# Patient Record
Sex: MALE | Race: WHITE | NOT HISPANIC OR LATINO
[De-identification: names, ages, dates, MRNs, and addresses within clinical notes are randomized per-mention and may not be internally consistent; named-entity substitution may affect disease eponyms.]

---

## 2017-07-18 ENCOUNTER — APPOINTMENT (OUTPATIENT)
Dept: OPHTHALMOLOGY | Facility: CLINIC | Age: 62
End: 2017-07-18

## 2017-07-18 PROBLEM — Z00.00 ENCOUNTER FOR PREVENTIVE HEALTH EXAMINATION: Status: ACTIVE | Noted: 2017-07-18

## 2021-02-05 ENCOUNTER — APPOINTMENT (OUTPATIENT)
Dept: COLORECTAL SURGERY | Facility: CLINIC | Age: 66
End: 2021-02-05
Payer: MEDICARE

## 2021-02-05 VITALS
TEMPERATURE: 98.4 F | HEIGHT: 74 IN | BODY MASS INDEX: 23.61 KG/M2 | WEIGHT: 184 LBS | HEART RATE: 55 BPM | SYSTOLIC BLOOD PRESSURE: 154 MMHG | DIASTOLIC BLOOD PRESSURE: 89 MMHG

## 2021-02-05 DIAGNOSIS — G47.30 SLEEP APNEA, UNSPECIFIED: ICD-10-CM

## 2021-02-05 DIAGNOSIS — Z80.0 FAMILY HISTORY OF MALIGNANT NEOPLASM OF DIGESTIVE ORGANS: ICD-10-CM

## 2021-02-05 DIAGNOSIS — Z87.820 PERSONAL HISTORY OF TRAUMATIC BRAIN INJURY: ICD-10-CM

## 2021-02-05 DIAGNOSIS — Z63.5 DISRUPTION OF FAMILY BY SEPARATION AND DIVORCE: ICD-10-CM

## 2021-02-05 PROCEDURE — 99072 ADDL SUPL MATRL&STAF TM PHE: CPT

## 2021-02-05 PROCEDURE — 99205 OFFICE O/P NEW HI 60 MIN: CPT

## 2021-02-05 SDOH — SOCIAL STABILITY - SOCIAL INSECURITY: DISRUPTION OF FAMILY BY SEPARATION AND DIVORCE: Z63.5

## 2021-02-05 NOTE — HISTORY OF PRESENT ILLNESS
[FreeTextEntry1] : 66 y/o M presents for evaluation of colon cancer\par \par Colonoscopy completed 1/23/21 with Dr. Boogie\par - 5 mm polyp found in the mid-ascending colon, resected and retrieved\par - 4 mm polyp in the mid-ascending colon, resected and retrieved\par - 2-3 mm polyp in the proximal transverse colon, multiple biopsies taken\par - 2-3 mm polyp in the mid-transverse colon, multiple biopsies taken\par - single 8 mm polyp in the distal transverse colon, resected and retrieved\par - 2 cm mass found in the sigmoid colon at 35 cm from the anus, areas tattooed\par \par Pathology\par A.) mid-ascending colon; TA\par B.) mid-ascending colon; TA\par C.) proximal transverse colon; colonic mucosa showing no significant histopathologic findings \par D.) mid-transverse colon; TA \par E.) distal transverse colon; TA\par F.) sigmoid colon; superficial fragments of adenocarcinoma \par \par Prior colonoscopy completed in  2016. Hemorrhoid and mild diverticulosis noted, prior h/o polyps\par \par CT C/A/P ordered by Dr. Boogie, authorization for A/P obtained but chest denied. Patient states they are working on getting approval and would schedule patient after appointment today \par \par Labs drawn 1/30/21. CEA- 6\par \par Several weeks ago had 2 episodes of BRBPR on the stool. Patient went for evaluation of bleeding with PCP who referred patient to GI for repeat colonoscopy\par Denies abdominal or rectal pain \par C/o intermittent nausea for ~ 4 months without episodes of emesis. Patient states he believes it may be related to anxiety \par Denies change in appetite or weight\par BH: Daily\par Denies constipation, diarrhea or straining \par Reports adequate dietary fiber intake, follows a vegetarian diet. Water intake is adequate \par FMH of colon cancer in mother, diagnosed age 81. Father w/ sarcoma diagnosed age 81

## 2021-02-05 NOTE — ASSESSMENT
[FreeTextEntry1] : I had extensive discussion with the patient (45 minutes) regarding the diagnosis and treatment options. I recommended that he consider proceeding with a robotic sigmoid colectomy.\par \par The associated risks, benefits, alternatives of the procedure have been outlined discussed and reviewed with the patient's family. These risks including but not limited to bleeding, infection, anastomotic leak, need for secondary surgery, need for ileostomy or colostomy creation, change in bowel habits, dvt, PE,, as well as the risk of heart and lung complications infection and death were detailed. The patient understands these risks and consents the planned procedure. Appropriate  literature regarding surgery and post operative treatment/complications and enhanced recovery pathway has been detailed and reviewed. Consent was obtained. All questions were answered.\par

## 2021-02-10 ENCOUNTER — RESULT REVIEW (OUTPATIENT)
Age: 66
End: 2021-02-10

## 2021-02-10 PROCEDURE — 88321 CONSLTJ&REPRT SLD PREP ELSWR: CPT

## 2021-02-11 ENCOUNTER — NON-APPOINTMENT (OUTPATIENT)
Age: 66
End: 2021-02-11

## 2021-02-11 ENCOUNTER — OUTPATIENT (OUTPATIENT)
Dept: OUTPATIENT SERVICES | Facility: HOSPITAL | Age: 66
LOS: 1 days | End: 2021-02-11
Payer: MEDICARE

## 2021-02-11 DIAGNOSIS — C18.9 MALIGNANT NEOPLASM OF COLON, UNSPECIFIED: ICD-10-CM

## 2021-02-11 LAB — SURGICAL PATHOLOGY STUDY: SIGNIFICANT CHANGE UP

## 2021-02-11 PROCEDURE — 88321 CONSLTJ&REPRT SLD PREP ELSWR: CPT

## 2021-02-17 ENCOUNTER — TRANSCRIPTION ENCOUNTER (OUTPATIENT)
Age: 66
End: 2021-02-17

## 2021-02-17 ENCOUNTER — APPOINTMENT (OUTPATIENT)
Dept: HEART AND VASCULAR | Facility: CLINIC | Age: 66
End: 2021-02-17
Payer: MEDICARE

## 2021-02-17 ENCOUNTER — NON-APPOINTMENT (OUTPATIENT)
Age: 66
End: 2021-02-17

## 2021-02-17 VITALS
SYSTOLIC BLOOD PRESSURE: 141 MMHG | DIASTOLIC BLOOD PRESSURE: 69 MMHG | WEIGHT: 180 LBS | TEMPERATURE: 98.2 F | HEART RATE: 68 BPM | RESPIRATION RATE: 18 BRPM | OXYGEN SATURATION: 98 % | BODY MASS INDEX: 23.1 KG/M2 | HEIGHT: 74 IN

## 2021-02-17 DIAGNOSIS — F41.9 ANXIETY DISORDER, UNSPECIFIED: ICD-10-CM

## 2021-02-17 DIAGNOSIS — Z86.59 PERSONAL HISTORY OF OTHER MENTAL AND BEHAVIORAL DISORDERS: ICD-10-CM

## 2021-02-17 PROCEDURE — 99204 OFFICE O/P NEW MOD 45 MIN: CPT

## 2021-02-17 PROCEDURE — 93000 ELECTROCARDIOGRAM COMPLETE: CPT | Mod: NC

## 2021-02-17 PROCEDURE — 99072 ADDL SUPL MATRL&STAF TM PHE: CPT

## 2021-02-17 NOTE — PHYSICAL EXAM
[General Appearance - Well Developed] : well developed [Normal Appearance] : normal appearance [Well Groomed] : well groomed [General Appearance - Well Nourished] : well nourished [No Deformities] : no deformities [General Appearance - In No Acute Distress] : no acute distress [Normal Conjunctiva] : the conjunctiva exhibited no abnormalities [Eyelids - No Xanthelasma] : the eyelids demonstrated no xanthelasmas [Normal Oral Mucosa] : normal oral mucosa [No Oral Pallor] : no oral pallor [No Oral Cyanosis] : no oral cyanosis [Normal Jugular Venous A Waves Present] : normal jugular venous A waves present [Normal Jugular Venous V Waves Present] : normal jugular venous V waves present [No Jugular Venous Faust A Waves] : no jugular venous faust A waves [Respiration, Rhythm And Depth] : normal respiratory rhythm and effort [Auscultation Breath Sounds / Voice Sounds] : lungs were clear to auscultation bilaterally [Exaggerated Use Of Accessory Muscles For Inspiration] : no accessory muscle use [Heart Rate And Rhythm] : heart rate and rhythm were normal [Heart Sounds] : normal S1 and S2 [Murmurs] : no murmurs present [Abdomen Soft] : soft [Abdomen Tenderness] : non-tender [Abdomen Mass (___ Cm)] : no abdominal mass palpated [Abnormal Walk] : normal gait [Gait - Sufficient For Exercise Testing] : the gait was sufficient for exercise testing [Nail Clubbing] : no clubbing of the fingernails [Cyanosis, Localized] : no localized cyanosis [Petechial Hemorrhages (___cm)] : no petechial hemorrhages [Skin Color & Pigmentation] : normal skin color and pigmentation [] : no rash [No Venous Stasis] : no venous stasis [Skin Lesions] : no skin lesions [No Skin Ulcers] : no skin ulcer [No Xanthoma] : no  xanthoma was observed [Oriented To Time, Place, And Person] : oriented to person, place, and time [Affect] : the affect was normal [Mood] : the mood was normal [No Anxiety] : not feeling anxious

## 2021-02-18 ENCOUNTER — APPOINTMENT (OUTPATIENT)
Dept: HEART AND VASCULAR | Facility: CLINIC | Age: 66
End: 2021-02-18

## 2021-02-18 PROBLEM — Z86.59 HISTORY OF DEPRESSION: Status: RESOLVED | Noted: 2021-02-17 | Resolved: 2021-02-18
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
## 2021-02-18 NOTE — DISCUSSION/SUMMARY
[Procedure Intermediate Risk] : the procedure risk is intermediate [Patient Intermediate Risk] : the patient is an intermediate risk [Optimized for Surgery] : the patient is optimized for surgery [FreeTextEntry1] : Pre-op From cardiovascular standpoint, Mr. ZURITA is of acceptable risk for the planned procedure and may move forward without further cardiovascular testing.\par PACs not a concerning finding, given good exercise tolerance. EKG reviewed.

## 2021-02-18 NOTE — REVIEW OF SYSTEMS
[Feeling Fatigued] : feeling fatigued [Shortness Of Breath] : shortness of breath [Nausea] : nausea [Negative] : Endocrine [Dyspnea on exertion] : not dyspnea during exertion [Chest  Pressure] : no chest pressure [Lower Ext Edema] : no extremity edema [Leg Claudication] : no intermittent leg claudication [Palpitations] : no palpitations

## 2021-02-22 ENCOUNTER — NON-APPOINTMENT (OUTPATIENT)
Age: 66
End: 2021-02-22

## 2021-02-22 ENCOUNTER — TRANSCRIPTION ENCOUNTER (OUTPATIENT)
Age: 66
End: 2021-02-22

## 2021-02-22 VITALS
DIASTOLIC BLOOD PRESSURE: 87 MMHG | SYSTOLIC BLOOD PRESSURE: 146 MMHG | HEART RATE: 88 BPM | RESPIRATION RATE: 16 BRPM | HEIGHT: 74 IN | WEIGHT: 182.76 LBS | OXYGEN SATURATION: 100 % | TEMPERATURE: 97 F

## 2021-02-22 LAB — SARS-COV-2 N GENE NPH QL NAA+PROBE: NOT DETECTED

## 2021-02-22 NOTE — PRE-OP CHECKLIST - SELECT TESTS ORDERED
CT thorax, Covid negative 2/20/CBC/CMP/PT/PTT/INR/EKG CT thorax, Covid negative 2/20/CBC/CMP/PT/PTT/INR/Type and Screen/EKG/COVID

## 2021-02-23 ENCOUNTER — RESULT REVIEW (OUTPATIENT)
Age: 66
End: 2021-02-23

## 2021-02-23 ENCOUNTER — INPATIENT (INPATIENT)
Facility: HOSPITAL | Age: 66
LOS: 1 days | Discharge: HOME | DRG: 331 | End: 2021-02-25
Attending: SURGERY | Admitting: SURGERY
Payer: MEDICARE

## 2021-02-23 ENCOUNTER — APPOINTMENT (OUTPATIENT)
Dept: COLORECTAL SURGERY | Facility: HOSPITAL | Age: 66
End: 2021-02-23

## 2021-02-23 DIAGNOSIS — Z41.9 ENCOUNTER FOR PROCEDURE FOR PURPOSES OTHER THAN REMEDYING HEALTH STATE, UNSPECIFIED: Chronic | ICD-10-CM

## 2021-02-23 LAB
ANION GAP SERPL CALC-SCNC: 9 MMOL/L — SIGNIFICANT CHANGE UP (ref 5–17)
BLD GP AB SCN SERPL QL: NEGATIVE — SIGNIFICANT CHANGE UP
BUN SERPL-MCNC: 8 MG/DL — SIGNIFICANT CHANGE UP (ref 7–23)
CALCIUM SERPL-MCNC: 8.8 MG/DL — SIGNIFICANT CHANGE UP (ref 8.4–10.5)
CHLORIDE SERPL-SCNC: 105 MMOL/L — SIGNIFICANT CHANGE UP (ref 96–108)
CO2 SERPL-SCNC: 23 MMOL/L — SIGNIFICANT CHANGE UP (ref 22–31)
CREAT SERPL-MCNC: 1.11 MG/DL — SIGNIFICANT CHANGE UP (ref 0.5–1.3)
GLUCOSE SERPL-MCNC: 157 MG/DL — HIGH (ref 70–99)
HCT VFR BLD CALC: 41.7 % — SIGNIFICANT CHANGE UP (ref 39–50)
HGB BLD-MCNC: 13.7 G/DL — SIGNIFICANT CHANGE UP (ref 13–17)
MAGNESIUM SERPL-MCNC: 1.8 MG/DL — SIGNIFICANT CHANGE UP (ref 1.6–2.6)
MCHC RBC-ENTMCNC: 31.9 PG — SIGNIFICANT CHANGE UP (ref 27–34)
MCHC RBC-ENTMCNC: 32.9 GM/DL — SIGNIFICANT CHANGE UP (ref 32–36)
MCV RBC AUTO: 97.2 FL — SIGNIFICANT CHANGE UP (ref 80–100)
NRBC # BLD: 0 /100 WBCS — SIGNIFICANT CHANGE UP (ref 0–0)
PHOSPHATE SERPL-MCNC: 4 MG/DL — SIGNIFICANT CHANGE UP (ref 2.5–4.5)
PLATELET # BLD AUTO: 152 K/UL — SIGNIFICANT CHANGE UP (ref 150–400)
POTASSIUM SERPL-MCNC: 4.1 MMOL/L — SIGNIFICANT CHANGE UP (ref 3.5–5.3)
POTASSIUM SERPL-SCNC: 4.1 MMOL/L — SIGNIFICANT CHANGE UP (ref 3.5–5.3)
RBC # BLD: 4.29 M/UL — SIGNIFICANT CHANGE UP (ref 4.2–5.8)
RBC # FLD: 12.4 % — SIGNIFICANT CHANGE UP (ref 10.3–14.5)
RH IG SCN BLD-IMP: POSITIVE — SIGNIFICANT CHANGE UP
SODIUM SERPL-SCNC: 137 MMOL/L — SIGNIFICANT CHANGE UP (ref 135–145)
WBC # BLD: 8.18 K/UL — SIGNIFICANT CHANGE UP (ref 3.8–10.5)
WBC # FLD AUTO: 8.18 K/UL — SIGNIFICANT CHANGE UP (ref 3.8–10.5)

## 2021-02-23 PROCEDURE — 45330 DIAGNOSTIC SIGMOIDOSCOPY: CPT | Mod: GC

## 2021-02-23 PROCEDURE — 44213 LAP MOBIL SPLENIC FL ADD-ON: CPT | Mod: GC

## 2021-02-23 PROCEDURE — 99222 1ST HOSP IP/OBS MODERATE 55: CPT

## 2021-02-23 PROCEDURE — 88304 TISSUE EXAM BY PATHOLOGIST: CPT | Mod: 26

## 2021-02-23 PROCEDURE — 88309 TISSUE EXAM BY PATHOLOGIST: CPT | Mod: 26

## 2021-02-23 PROCEDURE — 44207 L COLECTOMY/COLOPROCTOSTOMY: CPT | Mod: GC

## 2021-02-23 RX ORDER — ACETAMINOPHEN 500 MG
1000 TABLET ORAL ONCE
Refills: 0 | Status: COMPLETED | OUTPATIENT
Start: 2021-02-23 | End: 2021-02-23

## 2021-02-23 RX ORDER — SODIUM CHLORIDE 9 MG/ML
1000 INJECTION, SOLUTION INTRAVENOUS
Refills: 0 | Status: DISCONTINUED | OUTPATIENT
Start: 2021-02-23 | End: 2021-02-24

## 2021-02-23 RX ORDER — BUPIVACAINE 13.3 MG/ML
20 INJECTION, SUSPENSION, LIPOSOMAL INFILTRATION ONCE
Refills: 0 | Status: DISCONTINUED | OUTPATIENT
Start: 2021-02-23 | End: 2021-02-23

## 2021-02-23 RX ORDER — KETOROLAC TROMETHAMINE 30 MG/ML
15 SYRINGE (ML) INJECTION EVERY 6 HOURS
Refills: 0 | Status: DISCONTINUED | OUTPATIENT
Start: 2021-02-23 | End: 2021-02-25

## 2021-02-23 RX ORDER — HEPARIN SODIUM 5000 [USP'U]/ML
5000 INJECTION INTRAVENOUS; SUBCUTANEOUS ONCE
Refills: 0 | Status: COMPLETED | OUTPATIENT
Start: 2021-02-23 | End: 2021-02-23

## 2021-02-23 RX ORDER — ONDANSETRON 8 MG/1
4 TABLET, FILM COATED ORAL EVERY 6 HOURS
Refills: 0 | Status: DISCONTINUED | OUTPATIENT
Start: 2021-02-23 | End: 2021-02-25

## 2021-02-23 RX ORDER — HYDROMORPHONE HYDROCHLORIDE 2 MG/ML
0.5 INJECTION INTRAMUSCULAR; INTRAVENOUS; SUBCUTANEOUS EVERY 4 HOURS
Refills: 0 | Status: DISCONTINUED | OUTPATIENT
Start: 2021-02-23 | End: 2021-02-25

## 2021-02-23 RX ORDER — MAGNESIUM SULFATE 500 MG/ML
1 VIAL (ML) INJECTION ONCE
Refills: 0 | Status: COMPLETED | OUTPATIENT
Start: 2021-02-23 | End: 2021-02-23

## 2021-02-23 RX ORDER — ACETAMINOPHEN 500 MG
1000 TABLET ORAL EVERY 6 HOURS
Refills: 0 | Status: DISCONTINUED | OUTPATIENT
Start: 2021-02-23 | End: 2021-02-25

## 2021-02-23 RX ORDER — HEPARIN SODIUM 5000 [USP'U]/ML
5000 INJECTION INTRAVENOUS; SUBCUTANEOUS EVERY 8 HOURS
Refills: 0 | Status: DISCONTINUED | OUTPATIENT
Start: 2021-02-23 | End: 2021-02-25

## 2021-02-23 RX ORDER — GABAPENTIN 400 MG/1
600 CAPSULE ORAL ONCE
Refills: 0 | Status: COMPLETED | OUTPATIENT
Start: 2021-02-23 | End: 2021-02-23

## 2021-02-23 RX ADMIN — Medication 100 GRAM(S): at 20:07

## 2021-02-23 RX ADMIN — Medication 1000 MILLIGRAM(S): at 10:34

## 2021-02-23 RX ADMIN — GABAPENTIN 600 MILLIGRAM(S): 400 CAPSULE ORAL at 10:34

## 2021-02-23 RX ADMIN — HYDROMORPHONE HYDROCHLORIDE 0.5 MILLIGRAM(S): 2 INJECTION INTRAMUSCULAR; INTRAVENOUS; SUBCUTANEOUS at 19:04

## 2021-02-23 RX ADMIN — Medication 1000 MILLIGRAM(S): at 21:44

## 2021-02-23 RX ADMIN — ONDANSETRON 4 MILLIGRAM(S): 8 TABLET, FILM COATED ORAL at 22:12

## 2021-02-23 RX ADMIN — HEPARIN SODIUM 5000 UNIT(S): 5000 INJECTION INTRAVENOUS; SUBCUTANEOUS at 21:44

## 2021-02-23 RX ADMIN — HEPARIN SODIUM 5000 UNIT(S): 5000 INJECTION INTRAVENOUS; SUBCUTANEOUS at 10:34

## 2021-02-23 NOTE — H&P ADULT - NSICDXPASTSURGICALHX_GEN_ALL_CORE_FT
PAST SURGICAL HISTORY:  Surgery, elective b/l inguinal hernia repair    Surgery, elective prostate surgery for enlarged prostate    Surgery, elective b/l cataract extraction    Surgery, elective left big toe surgery
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
## 2021-02-23 NOTE — PROGRESS NOTE ADULT - SUBJECTIVE AND OBJECTIVE BOX
POST-OPERATIVE NOTE    Procedure: Robot-assisted laparoscopic left or sigmoid colectomy    Diagnosis/Indication: Cancer of sigmoid colon    Surgeon: Dr. Quiroz    S: Pt has no complaints. Denies CP, SOB, MERRITT, calf tenderness. Pain controlled with medication. Denies nausea, vomiting.    O:  T(C): 36.1 (02-23-21 @ 17:17), Max: 36.1 (02-23-21 @ 17:17)  T(F): 97 (02-23-21 @ 17:17), Max: 97 (02-23-21 @ 17:17)  HR: 70 (02-23-21 @ 19:00) (66 - 80)  BP: 143/77 (02-23-21 @ 19:00) (135/72 - 148/75)  RR: 13 (02-23-21 @ 19:00) (13 - 16)  SpO2: 100% (02-23-21 @ 19:00) (100% - 100%)  Wt(kg): --                        13.7   8.18  )-----------( 152      ( 23 Feb 2021 17:33 )             41.7     02-23    137  |  105  |  8   ----------------------------<  157<H>  4.1   |  23  |  1.11    Ca    8.8      23 Feb 2021 17:33  Phos  4.0     02-23  Mg     1.8     02-23    Gen: NAD, resting comfortably in bed  C/V: NSR  Pulm: Nonlabored breathing, no respiratory distress  Abd: soft, NT/ND, incisions clean and dry  Extrem: WWP, no calf edema or tenderness, SCDs in place    A/P: 65y Male s/p above procedure  Diet: CLD  IVF: LR @ 40cc/hr  Pain/nausea control  Felix  SQH/SCDs/OOBA/IS  Dispo pending pain control, PO tolerance, clinical improvement
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
## 2021-02-23 NOTE — H&P ADULT - NSHPPHYSICALEXAM_GEN_ALL_CORE
Constitutional: WDWN NAD  Heart: S1 S2  Lungs: no use of accessory muscles  Abdomen: soft, nontender, nondistended, without masses, ecchymosis, erythema

## 2021-02-23 NOTE — BRIEF OPERATIVE NOTE - NSICDXBRIEFPROCEDURE_GEN_ALL_CORE_FT
PROCEDURES:  Robot-assisted laparoscopic left or sigmoid colectomy 23-Feb-2021 17:21:04  Morelia Al

## 2021-02-23 NOTE — BRIEF OPERATIVE NOTE - OPERATION/FINDINGS
Lesion in sigmoid colon at ~35cm, marked with tattoo. Robotic assisted laparoscopic mobilization of splenic flexure and rectosigmoid. End to end colorectal anastomosis with 31mm stapler at approximately 17cm from anal verge. Insufflation test performed with no evidence of leak. Pfannenstiel incision closed with PDS; all skin incisions closed with monocryl and dermabond.

## 2021-02-24 LAB
ANION GAP SERPL CALC-SCNC: 11 MMOL/L — SIGNIFICANT CHANGE UP (ref 5–17)
BUN SERPL-MCNC: 9 MG/DL — SIGNIFICANT CHANGE UP (ref 7–23)
CALCIUM SERPL-MCNC: 9.5 MG/DL — SIGNIFICANT CHANGE UP (ref 8.4–10.5)
CHLORIDE SERPL-SCNC: 107 MMOL/L — SIGNIFICANT CHANGE UP (ref 96–108)
CO2 SERPL-SCNC: 24 MMOL/L — SIGNIFICANT CHANGE UP (ref 22–31)
CREAT SERPL-MCNC: 1.01 MG/DL — SIGNIFICANT CHANGE UP (ref 0.5–1.3)
GLUCOSE SERPL-MCNC: 106 MG/DL — HIGH (ref 70–99)
HCT VFR BLD CALC: 42.6 % — SIGNIFICANT CHANGE UP (ref 39–50)
HCV AB S/CO SERPL IA: 0.07 S/CO — SIGNIFICANT CHANGE UP
HCV AB SERPL-IMP: SIGNIFICANT CHANGE UP
HGB BLD-MCNC: 14 G/DL — SIGNIFICANT CHANGE UP (ref 13–17)
MAGNESIUM SERPL-MCNC: 2 MG/DL — SIGNIFICANT CHANGE UP (ref 1.6–2.6)
MCHC RBC-ENTMCNC: 31.7 PG — SIGNIFICANT CHANGE UP (ref 27–34)
MCHC RBC-ENTMCNC: 32.9 GM/DL — SIGNIFICANT CHANGE UP (ref 32–36)
MCV RBC AUTO: 96.6 FL — SIGNIFICANT CHANGE UP (ref 80–100)
NRBC # BLD: 0 /100 WBCS — SIGNIFICANT CHANGE UP (ref 0–0)
PHOSPHATE SERPL-MCNC: 3.5 MG/DL — SIGNIFICANT CHANGE UP (ref 2.5–4.5)
PLATELET # BLD AUTO: 150 K/UL — SIGNIFICANT CHANGE UP (ref 150–400)
POTASSIUM SERPL-MCNC: 4 MMOL/L — SIGNIFICANT CHANGE UP (ref 3.5–5.3)
POTASSIUM SERPL-SCNC: 4 MMOL/L — SIGNIFICANT CHANGE UP (ref 3.5–5.3)
RBC # BLD: 4.41 M/UL — SIGNIFICANT CHANGE UP (ref 4.2–5.8)
RBC # FLD: 12.3 % — SIGNIFICANT CHANGE UP (ref 10.3–14.5)
SODIUM SERPL-SCNC: 142 MMOL/L — SIGNIFICANT CHANGE UP (ref 135–145)
WBC # BLD: 8.03 K/UL — SIGNIFICANT CHANGE UP (ref 3.8–10.5)
WBC # FLD AUTO: 8.03 K/UL — SIGNIFICANT CHANGE UP (ref 3.8–10.5)

## 2021-02-24 RX ORDER — DIPHENHYDRAMINE HCL 50 MG
12.5 CAPSULE ORAL ONCE
Refills: 0 | Status: COMPLETED | OUTPATIENT
Start: 2021-02-24 | End: 2021-02-24

## 2021-02-24 RX ADMIN — Medication 12.5 MILLIGRAM(S): at 22:36

## 2021-02-24 RX ADMIN — Medication 1000 MILLIGRAM(S): at 11:38

## 2021-02-24 RX ADMIN — Medication 15 MILLIGRAM(S): at 17:24

## 2021-02-24 RX ADMIN — Medication 15 MILLIGRAM(S): at 00:07

## 2021-02-24 RX ADMIN — Medication 1000 MILLIGRAM(S): at 17:24

## 2021-02-24 RX ADMIN — Medication 15 MILLIGRAM(S): at 06:32

## 2021-02-24 RX ADMIN — Medication 15 MILLIGRAM(S): at 22:36

## 2021-02-24 RX ADMIN — HEPARIN SODIUM 5000 UNIT(S): 5000 INJECTION INTRAVENOUS; SUBCUTANEOUS at 22:36

## 2021-02-24 RX ADMIN — Medication 15 MILLIGRAM(S): at 11:40

## 2021-02-24 RX ADMIN — HEPARIN SODIUM 5000 UNIT(S): 5000 INJECTION INTRAVENOUS; SUBCUTANEOUS at 06:32

## 2021-02-24 RX ADMIN — Medication 1000 MILLIGRAM(S): at 06:32

## 2021-02-24 RX ADMIN — Medication 1000 MILLIGRAM(S): at 22:36

## 2021-02-24 RX ADMIN — HEPARIN SODIUM 5000 UNIT(S): 5000 INJECTION INTRAVENOUS; SUBCUTANEOUS at 13:22

## 2021-02-24 NOTE — PROGRESS NOTE ADULT - SUBJECTIVE AND OBJECTIVE BOX
Interval Events: Moved from recovery room to floor.    S: Patient doing well, denies fevers, chills, nausea, emesis, chest pain, SOB. (+) flatus (+) BM this AM. Felix in place, with some penile discomfort.    O: Vital Signs  T(C): 36.8 (02-24 @ 05:19), Max: 37 (02-23 @ 20:44)  HR: 78 (02-24 @ 05:19) (66 - 80)  BP: 169/99 (02-24 @ 05:19) (135/72 - 169/99)  RR: 14 (02-24 @ 05:19) (10 - 16)  SpO2: 100% (02-24 @ 05:19) (100% - 100%)  02-23-21 @ 07:01  -  02-24-21 @ 07:00  --------------------------------------------------------  IN:  Total IN: 0 mL    OUT:    Indwelling Catheter - Urethral (mL): 1850 mL  Total OUT: 1850 mL    Total NET: -1850 mL        General: alert and oriented, NAD  Resp: airway patent, respirations unlabored  CVS: regular rate and rhythm  Abdomen: soft, appropriately tender, nondistended  Incisions: C/D/I with dermabond in place  Extremities: no edema  Skin: warm, dry, appropriate color                          14.0   8.03  )-----------( 150      ( 24 Feb 2021 06:41 )             42.6   02-23    137  |  105  |  8   ----------------------------<  157<H>  4.1   |  23  |  1.11    Ca    8.8      23 Feb 2021 17:33  Phos  4.0     02-23  Mg     1.8     02-23     Interval Events: Moved from recovery room to floor. On CPAP overnight for [place]O.    S: Patient doing well, denies fevers, chills, nausea, emesis, chest pain, SOB. (+) flatus (+) BM this AM. Felix in place, with some penile discomfort.    O: Vital Signs  T(C): 36.8 (02-24 @ 05:19), Max: 37 (02-23 @ 20:44)  HR: 78 (02-24 @ 05:19) (66 - 80)  BP: 169/99 (02-24 @ 05:19) (135/72 - 169/99)  RR: 14 (02-24 @ 05:19) (10 - 16)  SpO2: 100% (02-24 @ 05:19) (100% - 100%)  02-23-21 @ 07:01  -  02-24-21 @ 07:00  --------------------------------------------------------  IN:  Total IN: 0 mL    OUT:    Indwelling Catheter - Urethral (mL): 1850 mL  Total OUT: 1850 mL    Total NET: -1850 mL        General: alert and oriented, NAD  Resp: airway patent, respirations unlabored  CVS: regular rate and rhythm  Abdomen: soft, appropriately tender, nondistended  Incisions: C/D/I with dermabond in place  Extremities: no edema  Skin: warm, dry, appropriate color                          14.0   8.03  )-----------( 150      ( 24 Feb 2021 06:41 )             42.6   02-23    137  |  105  |  8   ----------------------------<  157<H>  4.1   |  23  |  1.11    Ca    8.8      23 Feb 2021 17:33  Phos  4.0     02-23  Mg     1.8     02-23

## 2021-02-24 NOTE — PROGRESS NOTE ADULT - SUBJECTIVE AND OBJECTIVE BOX
SUBJECTIVE: Pt seen and examined at bedside with chief. Pt denies any complaints. Pain well controlled. Minimal PO intake without N/V. Denies any BF    MEDICATIONS  (STANDING):  acetaminophen   Tablet .. 1000 milliGRAM(s) Oral every 6 hours  heparin   Injectable 5000 Unit(s) SubCutaneous every 8 hours  ketorolac   Injectable 15 milliGRAM(s) IV Push every 6 hours  lactated ringers. 1000 milliLiter(s) (40 mL/Hr) IV Continuous <Continuous>    MEDICATIONS  (PRN):  HYDROmorphone  Injectable 0.5 milliGRAM(s) IV Push every 4 hours PRN Severe Pain (7 - 10)  ondansetron Injectable 4 milliGRAM(s) IV Push every 6 hours PRN Nausea and/or Vomiting      Vital Signs Last 24 Hrs  T(C): 36.8 (24 Feb 2021 05:19), Max: 37 (23 Feb 2021 20:44)  T(F): 98.3 (24 Feb 2021 05:19), Max: 98.6 (23 Feb 2021 20:44)  HR: 78 (24 Feb 2021 05:19) (66 - 80)  BP: 169/99 (24 Feb 2021 05:19) (135/72 - 169/99)  BP(mean): 104 (23 Feb 2021 20:44) (99 - 105)  RR: 14 (24 Feb 2021 05:19) (10 - 16)  SpO2: 100% (24 Feb 2021 05:19) (100% - 100%)    PHYSICAL EXAM:      Constitutional: A&Ox3    Respiratory: non labored breathing, no respiratory distress    Cardiovascular: NSR, RRR    Gastrointestinal: soft ND, NT                 Incision: CDI    Genitourinary: gonzalez to gravity     Extremities: (-) edema                  I&O's Detail    23 Feb 2021 07:01  -  24 Feb 2021 07:00  --------------------------------------------------------  IN:    Lactated Ringers: 440 mL  Total IN: 440 mL    OUT:    Indwelling Catheter - Urethral (mL): 1850 mL    Voided (mL): 300 mL  Total OUT: 2150 mL    Total NET: -1710 mL          LABS:                        14.0   8.03  )-----------( 150      ( 24 Feb 2021 06:41 )             42.6     02-24    142  |  107  |  9   ----------------------------<  x   4.0   |  24  |  1.01    Ca    9.5      24 Feb 2021 06:41  Phos  3.5     02-24  Mg     2.0     02-24            RADIOLOGY & ADDITIONAL STUDIES:

## 2021-02-25 ENCOUNTER — TRANSCRIPTION ENCOUNTER (OUTPATIENT)
Age: 66
End: 2021-02-25

## 2021-02-25 VITALS
DIASTOLIC BLOOD PRESSURE: 88 MMHG | TEMPERATURE: 98 F | HEART RATE: 76 BPM | RESPIRATION RATE: 16 BRPM | SYSTOLIC BLOOD PRESSURE: 155 MMHG | OXYGEN SATURATION: 100 %

## 2021-02-25 PROBLEM — G47.33 OBSTRUCTIVE SLEEP APNEA (ADULT) (PEDIATRIC): Chronic | Status: ACTIVE | Noted: 2021-02-22

## 2021-02-25 PROBLEM — C18.9 MALIGNANT NEOPLASM OF COLON, UNSPECIFIED: Chronic | Status: ACTIVE | Noted: 2021-02-22
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
## 2021-02-25 LAB
ANION GAP SERPL CALC-SCNC: 9 MMOL/L — SIGNIFICANT CHANGE UP (ref 5–17)
BUN SERPL-MCNC: 10 MG/DL — SIGNIFICANT CHANGE UP (ref 7–23)
CALCIUM SERPL-MCNC: 8.9 MG/DL — SIGNIFICANT CHANGE UP (ref 8.4–10.5)
CHLORIDE SERPL-SCNC: 106 MMOL/L — SIGNIFICANT CHANGE UP (ref 96–108)
CO2 SERPL-SCNC: 26 MMOL/L — SIGNIFICANT CHANGE UP (ref 22–31)
CREAT SERPL-MCNC: 0.89 MG/DL — SIGNIFICANT CHANGE UP (ref 0.5–1.3)
GLUCOSE SERPL-MCNC: 100 MG/DL — HIGH (ref 70–99)
HCT VFR BLD CALC: 41.5 % — SIGNIFICANT CHANGE UP (ref 39–50)
HGB BLD-MCNC: 13.7 G/DL — SIGNIFICANT CHANGE UP (ref 13–17)
MAGNESIUM SERPL-MCNC: 1.9 MG/DL — SIGNIFICANT CHANGE UP (ref 1.6–2.6)
MCHC RBC-ENTMCNC: 32.2 PG — SIGNIFICANT CHANGE UP (ref 27–34)
MCHC RBC-ENTMCNC: 33 GM/DL — SIGNIFICANT CHANGE UP (ref 32–36)
MCV RBC AUTO: 97.6 FL — SIGNIFICANT CHANGE UP (ref 80–100)
NRBC # BLD: 0 /100 WBCS — SIGNIFICANT CHANGE UP (ref 0–0)
PHOSPHATE SERPL-MCNC: 2.8 MG/DL — SIGNIFICANT CHANGE UP (ref 2.5–4.5)
PLATELET # BLD AUTO: 134 K/UL — LOW (ref 150–400)
POTASSIUM SERPL-MCNC: 3.9 MMOL/L — SIGNIFICANT CHANGE UP (ref 3.5–5.3)
POTASSIUM SERPL-SCNC: 3.9 MMOL/L — SIGNIFICANT CHANGE UP (ref 3.5–5.3)
RBC # BLD: 4.25 M/UL — SIGNIFICANT CHANGE UP (ref 4.2–5.8)
RBC # FLD: 12.6 % — SIGNIFICANT CHANGE UP (ref 10.3–14.5)
SODIUM SERPL-SCNC: 141 MMOL/L — SIGNIFICANT CHANGE UP (ref 135–145)
WBC # BLD: 6.55 K/UL — SIGNIFICANT CHANGE UP (ref 3.8–10.5)
WBC # FLD AUTO: 6.55 K/UL — SIGNIFICANT CHANGE UP (ref 3.8–10.5)

## 2021-02-25 RX ORDER — IBUPROFEN 200 MG
1 TABLET ORAL
Qty: 0 | Refills: 0 | DISCHARGE
Start: 2021-02-25

## 2021-02-25 RX ORDER — OXYCODONE HYDROCHLORIDE 5 MG/1
10 TABLET ORAL EVERY 4 HOURS
Refills: 0 | Status: DISCONTINUED | OUTPATIENT
Start: 2021-02-25 | End: 2021-02-25

## 2021-02-25 RX ORDER — OXYCODONE HYDROCHLORIDE 5 MG/1
1 TABLET ORAL
Qty: 0 | Refills: 0 | DISCHARGE
Start: 2021-02-25

## 2021-02-25 RX ORDER — ACETAMINOPHEN 500 MG
2 TABLET ORAL
Qty: 0 | Refills: 0 | DISCHARGE
Start: 2021-02-25

## 2021-02-25 RX ORDER — POTASSIUM CHLORIDE 20 MEQ
20 PACKET (EA) ORAL ONCE
Refills: 0 | Status: COMPLETED | OUTPATIENT
Start: 2021-02-25 | End: 2021-02-25

## 2021-02-25 RX ORDER — MAGNESIUM SULFATE 500 MG/ML
1 VIAL (ML) INJECTION ONCE
Refills: 0 | Status: COMPLETED | OUTPATIENT
Start: 2021-02-25 | End: 2021-02-25

## 2021-02-25 RX ORDER — IBUPROFEN 200 MG
400 TABLET ORAL EVERY 8 HOURS
Refills: 0 | Status: DISCONTINUED | OUTPATIENT
Start: 2021-02-25 | End: 2021-02-25

## 2021-02-25 RX ORDER — DOCUSATE SODIUM 100 MG
1 CAPSULE ORAL
Qty: 60 | Refills: 0
Start: 2021-02-25

## 2021-02-25 RX ORDER — OXYCODONE HYDROCHLORIDE 5 MG/1
5 TABLET ORAL EVERY 4 HOURS
Refills: 0 | Status: DISCONTINUED | OUTPATIENT
Start: 2021-02-25 | End: 2021-02-25

## 2021-02-25 RX ADMIN — Medication 1000 MILLIGRAM(S): at 06:44

## 2021-02-25 RX ADMIN — Medication 20 MILLIEQUIVALENT(S): at 08:51

## 2021-02-25 RX ADMIN — Medication 1000 MILLIGRAM(S): at 12:35

## 2021-02-25 RX ADMIN — Medication 100 GRAM(S): at 08:51

## 2021-02-25 RX ADMIN — Medication 15 MILLIGRAM(S): at 06:44

## 2021-02-25 RX ADMIN — HYDROMORPHONE HYDROCHLORIDE 0.5 MILLIGRAM(S): 2 INJECTION INTRAMUSCULAR; INTRAVENOUS; SUBCUTANEOUS at 04:37
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
## 2021-02-25 NOTE — DISCHARGE NOTE PROVIDER - CARE PROVIDER_API CALL
Michael Quiroz)  ColonRectal Surgery; Surgery  [facility], [address]nd [place] [address]  Phone: [phone]  Fax: [phone]  Established Patient  Follow Up Time: 2 weeks

## 2021-02-25 NOTE — DISCHARGE NOTE PROVIDER - NSDCMRMEDTOKEN_GEN_ALL_CORE_FT
acetaminophen 500 mg oral tablet: 2 tab(s) orally every 6 hours  ibuprofen 400 mg oral tablet: 1 tab(s) orally every 8 hours  oxyCODONE 5 mg oral tablet: 1 tab(s) orally every 4 hours, As needed, Moderate Pain (4 - 6)
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
## 2021-02-25 NOTE — DISCHARGE NOTE PROVIDER - NSDCCPCAREPLAN_GEN_ALL_CORE_FT
PRINCIPAL DISCHARGE DIAGNOSIS  Diagnosis: Colon cancer  Assessment and Plan of Treatment: FOLLOW-UP: Please follow up with your primary care physician in one week regarding your hospitalization, and follow up with your surgeon Dr. Quiroz in 7-10 days. Call to schedule an appointment.  BATHING: Please do not submerge wound underwater. You may shower and/or sponge bathe.  ACTIVITY: No heavy lifting or straining. Otherwise, you may return to your usual level of physical activity. If you are taking narcotic pain medication (such as Oxycodone) DO NOT drive a car, operate machinery or make important decisions.  DIET: You may return to your regular low fiber diet.  NOTIFY YOUR SURGEON IF: You have any bleeding that does not stop, any pus draining from your wound(s), any fever (over 100.4 F) or chills, persistent nausea/vomiting, persistent diarrhea, or if your pain is not controlled on your discharge pain medications.

## 2021-02-25 NOTE — DISCHARGE NOTE PROVIDER - NSDCFUADDINST_GEN_ALL_CORE_FT
Please take tylenol every 6 hours, and stagger with ibuprofen every 6 hours. This will allow you to alternate medications for more consistent pain control. For example: take a dose of tylenol at 8 am, then take a dose of ibuprofen at 11 am, then take a dose of tylenol at 2pm, and continue as needed.

## 2021-02-25 NOTE — DISCHARGE NOTE PROVIDER - NSDCFUSCHEDAPPT_GEN_ALL_CORE_FT
MARILYN ZURITA ; 04/14/2021 ; [facility] [address]MARILYN [place] ; 04/14/2021 ; [facility] [address]

## 2021-02-25 NOTE — DISCHARGE NOTE NURSING/CASE MANAGEMENT/SOCIAL WORK - PATIENT PORTAL LINK FT
You can access the FollowMyHealth Patient Portal offered by [facility] by registering at the following website: [email or web address]. By joining [facility]’s FollowMyHealth portal, you will also be able to view your health information using other applications (apps) compatible with our system.

## 2021-02-26 ENCOUNTER — NON-APPOINTMENT (OUTPATIENT)
Age: 66
End: 2021-02-26

## 2021-02-27 ENCOUNTER — NON-APPOINTMENT (OUTPATIENT)
Age: 66
End: 2021-02-27

## 2021-03-02 LAB — SURGICAL PATHOLOGY STUDY: SIGNIFICANT CHANGE UP

## 2021-03-03 ENCOUNTER — NON-APPOINTMENT (OUTPATIENT)
Age: 66
End: 2021-03-03

## 2021-03-03 DIAGNOSIS — Z87.820 PERSONAL HISTORY OF TRAUMATIC BRAIN INJURY: ICD-10-CM

## 2021-03-03 DIAGNOSIS — R10.9 UNSPECIFIED ABDOMINAL PAIN: ICD-10-CM

## 2021-03-03 DIAGNOSIS — C18.7 MALIGNANT NEOPLASM OF SIGMOID COLON: ICD-10-CM

## 2021-03-03 DIAGNOSIS — F41.9 ANXIETY DISORDER, UNSPECIFIED: ICD-10-CM

## 2021-03-03 DIAGNOSIS — G47.33 OBSTRUCTIVE SLEEP APNEA (ADULT) (PEDIATRIC): ICD-10-CM

## 2021-03-08 ENCOUNTER — APPOINTMENT (OUTPATIENT)
Dept: COLORECTAL SURGERY | Facility: CLINIC | Age: 66
End: 2021-03-08
Payer: MEDICARE

## 2021-03-08 VITALS
HEIGHT: 74 IN | DIASTOLIC BLOOD PRESSURE: 81 MMHG | HEART RATE: 77 BPM | BODY MASS INDEX: 23.1 KG/M2 | WEIGHT: 180 LBS | TEMPERATURE: 98.2 F | SYSTOLIC BLOOD PRESSURE: 145 MMHG

## 2021-03-08 PROCEDURE — 99024 POSTOP FOLLOW-UP VISIT: CPT

## 2021-03-08 RX ORDER — METRONIDAZOLE 500 MG/1
500 TABLET ORAL
Qty: 6 | Refills: 0 | Status: DISCONTINUED | COMMUNITY
Start: 2021-02-05 | End: 2021-03-08

## 2021-03-08 RX ORDER — NEOMYCIN SULFATE 500 MG/1
500 TABLET ORAL
Qty: 6 | Refills: 0 | Status: DISCONTINUED | COMMUNITY
Start: 2021-02-05 | End: 2021-03-08

## 2021-03-08 NOTE — HISTORY OF PRESENT ILLNESS
[FreeTextEntry1] : 67 y/o M presents for f/u T2N0 colon cancer\par \par s/p robotic sigmoid colectomy on 3/3/21\par \par Final Diagnosis\par 1. Sigmoid colon; sigmoidectomy:\par - Invasive adenocarcinoma, moderately-differentiated, 2.4\par cm.\par - The carcinoma invades muscularis propria.\par - Foci suspicious for lymphovascular invasion.\par - No perineural invasion seen.\par - Tumor deposits not present .\par - Proximal, distal and mesenteric margins, negative\par for carcinoma.\par - Twenty one lymph nodes, negative for carcinoma\par (0/21)\par \par pT2 N0\par \par Note: Immunohistochemistry for mismatch repair proteins\par pending.\par \par 2. Proximal donut; resection:\par - Portion of colon with no significant histologic findings.\par \par 3. Distal donut; resection:\par -  Portion of colon with no significant histologic findings.\par \par Comment\par MOL: 1G, 1E, 1F, 1H\par \par Synoptic Summary\par Procedure\par Sigmoidectomy\par Tumor Site\par Sigmoid colon\par Tumor Size\par Greatest dimension: 2.4 cm\par Additional dimensions (centimeters): 2 x 0.5cm\par Macroscopic Tumor Perforation\par Not identified\par Histologic Type\par Adenocarcinoma\par Histologic Grade\par G2: Moderately differentiated\par Tumor Extension\par Tumor invades muscularis propria\par Margins\par All margins are uninvolved by invasive carcinoma, high grade\par dysplasia / intramucosal carcinoma, and low grade dysplasia\par Margins examined: proximal, distal, and mesenteric\par Treatment Effect\par No known presurgical therapy\par Lymphovascular Invasion\par Foci suspicious for lymphovascular invasion present\par Perineural Invasion\par Not identified\par Tumor Budding\par Low score (0-4)\par Tumor Deposits\par Not identified\par Regional Lymph Nodes\par Number of Lymph Nodes Involved: 0\par Number of Lymph Nodes Examined: 21\par Pathologic Stage Classification (pTNM, AJCC 8th Edition):  pT2 N0\par pT2: Tumor invades the muscularis propria\par pN0: No regional lymph node metastasis\par Ancillary Studies\par Immunohistochemistry for mismatch repair proteins pending on\par block 1G.\par \par Denies rectal pain. Still notices intermittent minimal incision pain but no longer requires the use of pain medications\par Incisions are healing well\par Denies N/V. Appetite is continuing to improve, has already worked dietary fiber back in to diet without issue. Water intake is adequate \par Energy is slowly returning to normal\par BH: 2-3 times day. Soft and formed but caliber of stool is still decreased\par Taking Colace 1 tab BID

## 2021-03-08 NOTE — PHYSICAL EXAM
[Abdomen Masses] : No abdominal masses [Abdomen Tenderness] : ~T No ~M abdominal tenderness [No HSM] : no hepatosplenomegaly [JVD] : no jugular venous distention  [Normal Breath Sounds] : Normal breath sounds [Normal Heart Sounds] : normal heart sounds [de-identified] : Incisions are well-healed

## 2021-03-08 NOTE — ASSESSMENT
[FreeTextEntry1] : Stage I sigmoid colon cancer.\par Suspicion of lymphovascular invasion.\par 21 lymph nodes negative.\par \par No indication for postoperative adjuvant chemotherapy.\par \par Advised surveillance. Labs q.6 month.\par \par Return to primary gastroenterologist office in one year for surveillance colonoscopy.\par \par Return to our office in one year for CT scan

## 2021-03-16 PROCEDURE — C9399: CPT

## 2021-03-16 PROCEDURE — 82962 GLUCOSE BLOOD TEST: CPT

## 2021-03-16 PROCEDURE — 80048 BASIC METABOLIC PNL TOTAL CA: CPT

## 2021-03-16 PROCEDURE — 94660 CPAP INITIATION&MGMT: CPT

## 2021-03-16 PROCEDURE — S2900: CPT

## 2021-03-16 PROCEDURE — 85027 COMPLETE CBC AUTOMATED: CPT

## 2021-03-16 PROCEDURE — C1889: CPT

## 2021-03-16 PROCEDURE — 36415 COLL VENOUS BLD VENIPUNCTURE: CPT

## 2021-03-16 PROCEDURE — 86901 BLOOD TYPING SEROLOGIC RH(D): CPT

## 2021-03-16 PROCEDURE — 84100 ASSAY OF PHOSPHORUS: CPT

## 2021-03-16 PROCEDURE — 86900 BLOOD TYPING SEROLOGIC ABO: CPT

## 2021-03-16 PROCEDURE — 83735 ASSAY OF MAGNESIUM: CPT

## 2021-03-16 PROCEDURE — 86850 RBC ANTIBODY SCREEN: CPT

## 2021-03-16 PROCEDURE — 88304 TISSUE EXAM BY PATHOLOGIST: CPT

## 2021-03-16 PROCEDURE — 88309 TISSUE EXAM BY PATHOLOGIST: CPT

## 2021-03-16 PROCEDURE — 86803 HEPATITIS C AB TEST: CPT

## 2021-03-23 ENCOUNTER — NON-APPOINTMENT (OUTPATIENT)
Age: 66
End: 2021-03-23

## 2021-04-14 ENCOUNTER — APPOINTMENT (OUTPATIENT)
Dept: HEART AND VASCULAR | Facility: CLINIC | Age: 66
End: 2021-04-14
Payer: MEDICARE

## 2021-04-14 VITALS
HEIGHT: 74 IN | SYSTOLIC BLOOD PRESSURE: 153 MMHG | BODY MASS INDEX: 23.49 KG/M2 | DIASTOLIC BLOOD PRESSURE: 95 MMHG | WEIGHT: 183 LBS | OXYGEN SATURATION: 96 % | HEART RATE: 77 BPM

## 2021-04-14 DIAGNOSIS — C18.9 MALIGNANT NEOPLASM OF COLON, UNSPECIFIED: ICD-10-CM

## 2021-04-14 PROCEDURE — 99072 ADDL SUPL MATRL&STAF TM PHE: CPT

## 2021-04-14 PROCEDURE — 93306 TTE W/DOPPLER COMPLETE: CPT

## 2021-04-14 PROCEDURE — 99214 OFFICE O/P EST MOD 30 MIN: CPT | Mod: 25

## 2021-04-15 PROBLEM — C18.9 MALIGNANT NEOPLASM OF COLON: Status: ACTIVE | Noted: 2021-02-05

## 2021-04-15 NOTE — PHYSICAL EXAM
[General Appearance - Well Developed] : well developed [Normal Appearance] : normal appearance [Well Groomed] : well groomed [General Appearance - Well Nourished] : well nourished [No Deformities] : no deformities [General Appearance - In No Acute Distress] : no acute distress [Normal Conjunctiva] : the conjunctiva exhibited no abnormalities [Eyelids - No Xanthelasma] : the eyelids demonstrated no xanthelasmas [Normal Oral Mucosa] : normal oral mucosa [No Oral Pallor] : no oral pallor [No Oral Cyanosis] : no oral cyanosis [Normal Jugular Venous A Waves Present] : normal jugular venous A waves present [Normal Jugular Venous V Waves Present] : normal jugular venous V waves present [No Jugular Venous Faust A Waves] : no jugular venous faust A waves [Respiration, Rhythm And Depth] : normal respiratory rhythm and effort [Exaggerated Use Of Accessory Muscles For Inspiration] : no accessory muscle use [Auscultation Breath Sounds / Voice Sounds] : lungs were clear to auscultation bilaterally [Heart Rate And Rhythm] : heart rate and rhythm were normal [Heart Sounds] : normal S1 and S2 [Murmurs] : no murmurs present [Abdomen Soft] : soft [Abdomen Tenderness] : non-tender [Abdomen Mass (___ Cm)] : no abdominal mass palpated [Abnormal Walk] : normal gait [Gait - Sufficient For Exercise Testing] : the gait was sufficient for exercise testing [Nail Clubbing] : no clubbing of the fingernails [Cyanosis, Localized] : no localized cyanosis [Petechial Hemorrhages (___cm)] : no petechial hemorrhages [Skin Color & Pigmentation] : normal skin color and pigmentation [] : no rash [No Venous Stasis] : no venous stasis [Skin Lesions] : no skin lesions [No Skin Ulcers] : no skin ulcer [No Xanthoma] : no  xanthoma was observed [Oriented To Time, Place, And Person] : oriented to person, place, and time [Affect] : the affect was normal [Mood] : the mood was normal [No Anxiety] : not feeling anxious

## 2021-04-15 NOTE — DISCUSSION/SUMMARY
[FreeTextEntry1] : PACs no intervention for the moment. echo is normal; will carefully monitor\par HTN suspect more of an anxiety issues; carefully monitor, moderate stress as best he is able; will see for carotid u/s provided his insurance company feels that it is a reasonable course of action and deems appropriate to approve.\par

## 2021-04-15 NOTE — REASON FOR VISIT
[Follow-Up - Clinic] : a clinic follow-up of [Abnormal ECG] : an abnormal ECG [Hypertension] : hypertension [FreeTextEntry1] : Tristan comes in for a follow up after his surgery. He completed his echocardiogram today. He is overall uncomfortable and tremulous. His echocardiogram is unremarkable. He appears very anxious and hypertensive on arrival.

## 2021-05-26 ENCOUNTER — APPOINTMENT (OUTPATIENT)
Dept: HEART AND VASCULAR | Facility: CLINIC | Age: 66
End: 2021-05-26
Payer: MEDICARE

## 2021-05-26 VITALS
SYSTOLIC BLOOD PRESSURE: 120 MMHG | HEIGHT: 74 IN | OXYGEN SATURATION: 97 % | WEIGHT: 184 LBS | HEART RATE: 72 BPM | DIASTOLIC BLOOD PRESSURE: 72 MMHG | BODY MASS INDEX: 23.61 KG/M2

## 2021-05-26 DIAGNOSIS — Z01.810 ENCOUNTER FOR PREPROCEDURAL CARDIOVASCULAR EXAMINATION: ICD-10-CM

## 2021-05-26 PROCEDURE — 93880 EXTRACRANIAL BILAT STUDY: CPT

## 2021-05-26 PROCEDURE — 99214 OFFICE O/P EST MOD 30 MIN: CPT

## 2021-05-26 PROCEDURE — 99072 ADDL SUPL MATRL&STAF TM PHE: CPT

## 2021-05-27 PROBLEM — Z01.810 PREOPERATIVE CARDIOVASCULAR EXAMINATION: Status: RESOLVED | Noted: 2021-02-17 | Resolved: 2021-05-27

## 2021-05-27 NOTE — REASON FOR VISIT
[Symptom and Test Evaluation] : symptom and test evaluation [Hypertension] : hypertension [FreeTextEntry1] : Tristan comes in for a second follow up after his surgery. He completed his echocardiogram and carotid u/s today. He is overall uncomfortable and tremulous. His echocardiogram is unremarkable. He appears slightly less anxious and less hypertensive on arrival.

## 2021-05-27 NOTE — DISCUSSION/SUMMARY
[FreeTextEntry1] : Borderline HTN - MARILYN  and I had an extensive discussion regarding his blood pressure management. Patient will continue taking current medications in addition to maintaining a low Na diet, with periodic b/p checks at home.\par PACs uncertain significance. Inclined towards a conservative follow up in this patient. We had a careful discussion regarding diet and exercise. Will be happy to re-evaluate.\par

## 2021-07-01 ENCOUNTER — NON-APPOINTMENT (OUTPATIENT)
Age: 66
End: 2021-07-01

## 2021-09-22 ENCOUNTER — APPOINTMENT (OUTPATIENT)
Dept: HEART AND VASCULAR | Facility: CLINIC | Age: 66
End: 2021-09-22
Payer: MEDICARE

## 2021-09-22 VITALS
SYSTOLIC BLOOD PRESSURE: 154 MMHG | WEIGHT: 176 LBS | HEIGHT: 74 IN | BODY MASS INDEX: 22.59 KG/M2 | HEART RATE: 69 BPM | OXYGEN SATURATION: 99 % | DIASTOLIC BLOOD PRESSURE: 95 MMHG

## 2021-09-22 PROCEDURE — 99214 OFFICE O/P EST MOD 30 MIN: CPT

## 2021-09-23 NOTE — DISCUSSION/SUMMARY
[FreeTextEntry1] : HTN - MARILYN  and I had an extensive discussion regarding his blood pressure management. Patient will continue taking current medications in addition to maintaining a low Na diet, with periodic b/p checks at home.\par PACs Inclined towards a conservative follow up in this patient. We had a careful discussion regarding diet and exercise. Will be happy to re-evaluate.\par

## 2021-09-23 NOTE — REASON FOR VISIT
[Symptom and Test Evaluation] : symptom and test evaluation [FreeTextEntry1] : Tristan comes in for a second follow up after his last evaluation. He is doing fair. No chest pain noted. Blood pressure readings at home are not unreasonable. We are discussing symptoms and blood pressure measurements

## 2022-04-20 ENCOUNTER — APPOINTMENT (OUTPATIENT)
Dept: HEART AND VASCULAR | Facility: CLINIC | Age: 67
End: 2022-04-20

## 2023-12-12 NOTE — PATIENT PROFILE ADULT - NSPROPASSIVESMOKEEXPOSURE_GEN_A_NUR
12/12/23 0842   Readmission Assessment   Number of Days since last admission? 8-30 days   Previous Disposition Home with Family   Who is being Interviewed Patient   What was the patient's/caregiver's perception as to why they think they needed to return back to the hospital? Other (Comment)  (The pt reported she returned to the hospital due to having Palpitations)   Did you visit your Primary Care Physician after you left the hospital, before you returned this time? No   Why weren't you able to visit your PCP? Other (Comment)  (The pt has a appt set for Dec 22nd to see her PCP)   Did you see a specialist, such as Cardiac, Pulmonary, Orthopedic Physician, etc. after you left the hospital? Yes   Who advised the patient to return to the hospital? Other (Comment)  (Family)   Does the patient report anything that got in the way of taking their medications? No   In our efforts to provide the best possible care to you and others like you, can you think of anything that we could have done to help you after you left the hospital the first time, so that you might not have needed to return so soon?  Other (Comment)  (The pt reports no concerns at this time.) No

## 2024-01-29 ENCOUNTER — APPOINTMENT (OUTPATIENT)
Dept: HEART AND VASCULAR | Facility: CLINIC | Age: 69
End: 2024-01-29
Payer: MEDICARE

## 2024-01-29 ENCOUNTER — NON-APPOINTMENT (OUTPATIENT)
Age: 69
End: 2024-01-29

## 2024-01-29 VITALS
OXYGEN SATURATION: 97 % | HEIGHT: 74 IN | DIASTOLIC BLOOD PRESSURE: 104 MMHG | SYSTOLIC BLOOD PRESSURE: 165 MMHG | WEIGHT: 185 LBS | HEART RATE: 85 BPM | TEMPERATURE: 97.6 F | BODY MASS INDEX: 23.74 KG/M2

## 2024-01-29 DIAGNOSIS — R03.0 ELEVATED BLOOD-PRESSURE READING, W/OUT DIAGNOSIS OF HYPERTENSION: ICD-10-CM

## 2024-01-29 PROCEDURE — G2211 COMPLEX E/M VISIT ADD ON: CPT

## 2024-01-29 PROCEDURE — 99214 OFFICE O/P EST MOD 30 MIN: CPT | Mod: 25

## 2024-01-29 PROCEDURE — 93000 ELECTROCARDIOGRAM COMPLETE: CPT

## 2024-01-29 NOTE — DISCUSSION/SUMMARY
[FreeTextEntry1] : MERRITT check echo if able to approve and schedule. HTN start low dose diovan and low Na diet EKG section of the chart --- secondary to symptoms above an electrocardiogram also known as an EKG was performed.  Risks and benefits discussed with the patient. Patient was given time and privacy to changed into a gown. Shortly after, standard 10 leads were applied and a IMVU system was used to perform the study. The results were subsequently reviewed by attending physician and discussed with the patient. The study showed a normal sinus rhythm and no ST-T suggestive of ischemia. Order for the EKG was placed in the chart. The results were documented. Billing submitted. [EKG obtained to assist in diagnosis and management of assessed problem(s)] : EKG obtained to assist in diagnosis and management of assessed problem(s) No

## 2024-01-29 NOTE — REASON FOR VISIT
[Symptom and Test Evaluation] : symptom and test evaluation [FreeTextEntry1] : Patient was last seen in 2021. He has not been following up with cards or  primary care. He arrives hypertensive, added to the schedule for clearance for colonoscopy. He wants to get that done as quickly as possible. No chest pain. Occasional dyspnea noted.

## 2024-02-05 ENCOUNTER — APPOINTMENT (OUTPATIENT)
Dept: HEART AND VASCULAR | Facility: CLINIC | Age: 69
End: 2024-02-05
Payer: MEDICARE

## 2024-02-05 VITALS
SYSTOLIC BLOOD PRESSURE: 142 MMHG | OXYGEN SATURATION: 100 % | DIASTOLIC BLOOD PRESSURE: 83 MMHG | BODY MASS INDEX: 23.74 KG/M2 | HEART RATE: 80 BPM | WEIGHT: 185 LBS | HEIGHT: 74 IN

## 2024-02-05 PROCEDURE — 93306 TTE W/DOPPLER COMPLETE: CPT

## 2024-02-05 PROCEDURE — G2211 COMPLEX E/M VISIT ADD ON: CPT

## 2024-02-05 PROCEDURE — 99214 OFFICE O/P EST MOD 30 MIN: CPT

## 2024-02-05 NOTE — DISCUSSION/SUMMARY
[Procedure Low Risk] : the procedure risk is low [Patient Low Risk] : the patient is a low surgical risk [FreeTextEntry1] : Preop From cardiovascular standpoint, Mr. ZURITA is of acceptable risk for the planned procedure and may move forward without further cardiovascular testing. HTN cont valsartan and low Na diet.  Primary care visit

## 2024-05-22 ENCOUNTER — APPOINTMENT (OUTPATIENT)
Dept: HEART AND VASCULAR | Facility: CLINIC | Age: 69
End: 2024-05-22
Payer: MEDICARE

## 2024-05-22 VITALS
BODY MASS INDEX: 23.87 KG/M2 | OXYGEN SATURATION: 98 % | HEIGHT: 74 IN | WEIGHT: 186 LBS | HEART RATE: 77 BPM | TEMPERATURE: 98.3 F | DIASTOLIC BLOOD PRESSURE: 91 MMHG | SYSTOLIC BLOOD PRESSURE: 143 MMHG

## 2024-05-22 DIAGNOSIS — R06.09 OTHER FORMS OF DYSPNEA: ICD-10-CM

## 2024-05-22 DIAGNOSIS — I49.1 ATRIAL PREMATURE DEPOLARIZATION: ICD-10-CM

## 2024-05-22 PROCEDURE — G2211 COMPLEX E/M VISIT ADD ON: CPT

## 2024-05-22 PROCEDURE — 99214 OFFICE O/P EST MOD 30 MIN: CPT

## 2024-05-22 PROCEDURE — 93976 VASCULAR STUDY: CPT

## 2024-05-22 RX ORDER — VALSARTAN 80 MG/1
80 TABLET, COATED ORAL
Qty: 90 | Refills: 3 | Status: ACTIVE | COMMUNITY
Start: 2024-01-29 | End: 1900-01-01

## 2024-05-22 NOTE — REASON FOR VISIT
[Symptom and Test Evaluation] : symptom and test evaluation [FreeTextEntry1] : 69 year old man comes for a follow up. As to symptoms, occasional dyspnea and fatigue noted. This is often noted with activity. Symptoms always improve at rest. Blood pressure is better. No recent primary care eval.

## 2024-05-22 NOTE — DISCUSSION/SUMMARY
[FreeTextEntry1] : HTN - MARILYN  and I had an extensive discussion regarding his blood pressure management. Patient will continue taking current medications in addition to maintaining a low Na diet, with periodic b/p checks at home. renal us reviewed SOB - check echo if able to approve and schedule. HLD MARILYN and I discussed his lipid panel and individualized target LDL goal. At this point, will do diet and exercise with anticipation of re-evaluating labs in 3-6 months

## 2024-11-20 ENCOUNTER — APPOINTMENT (OUTPATIENT)
Dept: HEART AND VASCULAR | Facility: CLINIC | Age: 69
End: 2024-11-20
Payer: MEDICARE

## 2024-11-20 VITALS
DIASTOLIC BLOOD PRESSURE: 76 MMHG | WEIGHT: 187 LBS | HEIGHT: 74 IN | BODY MASS INDEX: 24 KG/M2 | HEART RATE: 62 BPM | SYSTOLIC BLOOD PRESSURE: 121 MMHG | OXYGEN SATURATION: 98 %

## 2024-11-20 DIAGNOSIS — I49.1 ATRIAL PREMATURE DEPOLARIZATION: ICD-10-CM

## 2024-11-20 DIAGNOSIS — Z85.038 PERSONAL HISTORY OF OTHER MALIGNANT NEOPLASM OF LARGE INTESTINE: ICD-10-CM

## 2024-11-20 DIAGNOSIS — R06.09 OTHER FORMS OF DYSPNEA: ICD-10-CM

## 2024-11-20 DIAGNOSIS — R03.0 ELEVATED BLOOD-PRESSURE READING, W/OUT DIAGNOSIS OF HYPERTENSION: ICD-10-CM

## 2024-11-20 DIAGNOSIS — Z86.69 PERSONAL HISTORY OF OTHER DISEASES OF THE NERVOUS SYSTEM AND SENSE ORGANS: ICD-10-CM

## 2024-11-20 DIAGNOSIS — Z86.59 PERSONAL HISTORY OF OTHER MENTAL AND BEHAVIORAL DISORDERS: ICD-10-CM

## 2024-11-20 PROCEDURE — 99214 OFFICE O/P EST MOD 30 MIN: CPT

## 2024-11-20 PROCEDURE — 93880 EXTRACRANIAL BILAT STUDY: CPT

## 2024-11-20 PROCEDURE — 93306 TTE W/DOPPLER COMPLETE: CPT

## 2024-11-20 PROCEDURE — G2211 COMPLEX E/M VISIT ADD ON: CPT

## 2024-11-21 PROBLEM — Z86.69 HISTORY OF SLEEP APNEA: Status: RESOLVED | Noted: 2021-02-05 | Resolved: 2024-11-21

## 2024-11-21 PROBLEM — Z86.59 HISTORY OF ANXIETY: Status: RESOLVED | Noted: 2021-02-17 | Resolved: 2024-11-21

## 2024-11-21 PROBLEM — Z85.038 HISTORY OF MALIGNANT NEOPLASM OF COLON: Status: RESOLVED | Noted: 2021-02-05 | Resolved: 2024-11-21

## 2024-11-22 ENCOUNTER — APPOINTMENT (OUTPATIENT)
Dept: UROLOGY | Facility: CLINIC | Age: 69
End: 2024-11-22
Payer: MEDICARE

## 2024-11-22 ENCOUNTER — NON-APPOINTMENT (OUTPATIENT)
Age: 69
End: 2024-11-22

## 2024-11-22 VITALS
HEART RATE: 73 BPM | HEIGHT: 74 IN | SYSTOLIC BLOOD PRESSURE: 148 MMHG | TEMPERATURE: 97.7 F | DIASTOLIC BLOOD PRESSURE: 93 MMHG | WEIGHT: 187 LBS | BODY MASS INDEX: 24 KG/M2

## 2024-11-22 DIAGNOSIS — Q38.6 OTHER CONGENITAL MALFORMATIONS OF MOUTH: ICD-10-CM

## 2024-11-22 DIAGNOSIS — Z80.42 FAMILY HISTORY OF MALIGNANT NEOPLASM OF PROSTATE: ICD-10-CM

## 2024-11-22 DIAGNOSIS — N43.3 HYDROCELE, UNSPECIFIED: ICD-10-CM

## 2024-11-22 LAB
PSA SERPL-MCNC: 4.5
PSA SERPL-MCNC: 7.3

## 2024-11-22 PROCEDURE — 99204 OFFICE O/P NEW MOD 45 MIN: CPT

## 2024-11-24 LAB — BACTERIA UR CULT: NORMAL

## 2024-12-09 ENCOUNTER — APPOINTMENT (OUTPATIENT)
Dept: MRI IMAGING | Facility: CLINIC | Age: 69
End: 2024-12-09
Payer: MEDICARE

## 2024-12-09 ENCOUNTER — OUTPATIENT (OUTPATIENT)
Dept: OUTPATIENT SERVICES | Facility: HOSPITAL | Age: 69
LOS: 1 days | End: 2024-12-09

## 2024-12-09 ENCOUNTER — RESULT REVIEW (OUTPATIENT)
Age: 69
End: 2024-12-09

## 2024-12-09 DIAGNOSIS — Z41.9 ENCOUNTER FOR PROCEDURE FOR PURPOSES OTHER THAN REMEDYING HEALTH STATE, UNSPECIFIED: Chronic | ICD-10-CM

## 2024-12-09 PROCEDURE — 76498P: CUSTOM | Mod: 26

## 2024-12-09 PROCEDURE — 72197 MRI PELVIS W/O & W/DYE: CPT | Mod: 26

## 2024-12-20 ENCOUNTER — APPOINTMENT (OUTPATIENT)
Dept: UROLOGY | Facility: CLINIC | Age: 69
End: 2024-12-20
Payer: MEDICARE

## 2024-12-20 DIAGNOSIS — R97.20 ELEVATED PROSTATE, SPECIFIC ANTIGEN [PSA]: ICD-10-CM

## 2024-12-20 PROCEDURE — 99213 OFFICE O/P EST LOW 20 MIN: CPT

## 2024-12-30 ENCOUNTER — NON-APPOINTMENT (OUTPATIENT)
Age: 69
End: 2024-12-30

## 2025-01-29 ENCOUNTER — APPOINTMENT (OUTPATIENT)
Dept: UROLOGY | Facility: AMBULATORY SURGERY CENTER | Age: 70
End: 2025-01-29

## 2025-03-21 ENCOUNTER — APPOINTMENT (OUTPATIENT)
Dept: UROLOGY | Facility: CLINIC | Age: 70
End: 2025-03-21
Payer: MEDICARE

## 2025-03-21 ENCOUNTER — NON-APPOINTMENT (OUTPATIENT)
Age: 70
End: 2025-03-21

## 2025-03-21 VITALS
SYSTOLIC BLOOD PRESSURE: 152 MMHG | HEART RATE: 96 BPM | OXYGEN SATURATION: 99 % | DIASTOLIC BLOOD PRESSURE: 89 MMHG | TEMPERATURE: 97.9 F

## 2025-03-21 DIAGNOSIS — R97.20 ELEVATED PROSTATE, SPECIFIC ANTIGEN [PSA]: ICD-10-CM

## 2025-03-21 PROCEDURE — 99214 OFFICE O/P EST MOD 30 MIN: CPT

## 2025-03-22 ENCOUNTER — TRANSCRIPTION ENCOUNTER (OUTPATIENT)
Age: 70
End: 2025-03-22

## 2025-03-22 LAB
PSA FREE FLD-MCNC: 26 %
PSA FREE SERPL-MCNC: 1.52 NG/ML
PSA SERPL-MCNC: 5.86 NG/ML

## 2025-03-27 ENCOUNTER — TRANSCRIPTION ENCOUNTER (OUTPATIENT)
Age: 70
End: 2025-03-27

## 2025-03-27 LAB
4K SCORE: 10.5
FREE PSA (BIOREFERENCE): 1.48 NG/ML
PERCENT FREE PSA: 26 %
TOTAL PSA (BIOREFERENCE): 5.61 NG/ML

## 2025-05-20 ENCOUNTER — APPOINTMENT (OUTPATIENT)
Dept: HEART AND VASCULAR | Facility: CLINIC | Age: 70
End: 2025-05-20
Payer: MEDICARE

## 2025-05-20 ENCOUNTER — NON-APPOINTMENT (OUTPATIENT)
Age: 70
End: 2025-05-20

## 2025-05-20 VITALS
TEMPERATURE: 97.7 F | SYSTOLIC BLOOD PRESSURE: 129 MMHG | OXYGEN SATURATION: 100 % | BODY MASS INDEX: 22.59 KG/M2 | HEART RATE: 69 BPM | WEIGHT: 176 LBS | DIASTOLIC BLOOD PRESSURE: 82 MMHG | HEIGHT: 74 IN

## 2025-05-20 DIAGNOSIS — I49.1 ATRIAL PREMATURE DEPOLARIZATION: ICD-10-CM

## 2025-05-20 DIAGNOSIS — R06.09 OTHER FORMS OF DYSPNEA: ICD-10-CM

## 2025-05-20 PROCEDURE — 93000 ELECTROCARDIOGRAM COMPLETE: CPT

## 2025-05-20 PROCEDURE — 99214 OFFICE O/P EST MOD 30 MIN: CPT

## 2025-05-20 PROCEDURE — G2211 COMPLEX E/M VISIT ADD ON: CPT

## 2025-05-26 NOTE — DISCHARGE NOTE PROVIDER - NSDCCONDITION_GEN_ALL_CORE
Patient is resting in bed with no complaints of pain and no signs of distress.  Respirations are even and unlabored, no constant observer available at this time.         Willy Weinstein RN  05/26/25 0598     Stable

## 2025-06-20 ENCOUNTER — APPOINTMENT (OUTPATIENT)
Dept: UROLOGY | Facility: CLINIC | Age: 70
End: 2025-06-20
Payer: MEDICARE

## 2025-06-20 VITALS
SYSTOLIC BLOOD PRESSURE: 121 MMHG | HEART RATE: 94 BPM | DIASTOLIC BLOOD PRESSURE: 86 MMHG | OXYGEN SATURATION: 97 % | TEMPERATURE: 98.3 F

## 2025-06-20 PROCEDURE — 99214 OFFICE O/P EST MOD 30 MIN: CPT

## 2025-06-21 ENCOUNTER — TRANSCRIPTION ENCOUNTER (OUTPATIENT)
Age: 70
End: 2025-06-21

## 2025-06-21 LAB
PSA FREE FLD-MCNC: 23 %
PSA FREE SERPL-MCNC: 1.11 NG/ML
PSA SERPL-MCNC: 4.89 NG/ML

## 2025-06-22 LAB — BACTERIA UR CULT: NORMAL

## 2025-06-23 ENCOUNTER — TRANSCRIPTION ENCOUNTER (OUTPATIENT)
Age: 70
End: 2025-06-23

## 2025-07-05 ENCOUNTER — APPOINTMENT (OUTPATIENT)
Dept: MRI IMAGING | Facility: CLINIC | Age: 70
End: 2025-07-05

## 2025-07-05 ENCOUNTER — OUTPATIENT (OUTPATIENT)
Dept: OUTPATIENT SERVICES | Facility: HOSPITAL | Age: 70
LOS: 1 days | End: 2025-07-05

## 2025-07-05 DIAGNOSIS — Z41.9 ENCOUNTER FOR PROCEDURE FOR PURPOSES OTHER THAN REMEDYING HEALTH STATE, UNSPECIFIED: Chronic | ICD-10-CM

## 2025-07-05 PROCEDURE — 76498P: CUSTOM | Mod: 26

## 2025-07-05 PROCEDURE — 72197 MRI PELVIS W/O & W/DYE: CPT | Mod: 26

## 2025-07-14 ENCOUNTER — TRANSCRIPTION ENCOUNTER (OUTPATIENT)
Age: 70
End: 2025-07-14

## 2025-07-15 ENCOUNTER — TRANSCRIPTION ENCOUNTER (OUTPATIENT)
Age: 70
End: 2025-07-15

## 2025-07-17 ENCOUNTER — APPOINTMENT (OUTPATIENT)
Dept: UROLOGY | Facility: CLINIC | Age: 70
End: 2025-07-17
Payer: MEDICARE

## 2025-07-17 PROCEDURE — 99214 OFFICE O/P EST MOD 30 MIN: CPT | Mod: 93
